# Patient Record
(demographics unavailable — no encounter records)

---

## 2024-10-21 NOTE — PHYSICAL EXAM
[Hui's Sign] : positive Hui's sign [UE] : Sensory: Intact in bilateral upper extremities [ALL] : dorsalis pedis, posterior tibial, femoral, popliteal, and radial 2+ and symmetric bilaterally [] : Motor: [NL] : Motor strength of the left upper extremities is normal [Motor Strength Upper Extremities] : right (5/5) [de-identified] : Range of Motion: is limited and is painful. Cervical TTP right paracervicals.  NTTP C spine  Skin intact C spine. No rashes, ulcers, blisters.  No lymphedema.   [de-identified] : MRI cervical spine [place] [facility] 10/21/19: PRIOR ANTERIOR DISCECTOMY AND FUSION SURGERY, C4-5, C5-6 AND C6-7, UTILIZING DISC GRAFT AND A METALLIC ANTERIOR C4 TO C7 FIXATION PLATE. IMAGING IS LIMITED BY UNAVOIDABLE SUSCEPTIBILITY ARTIFACT, BUT NO SOFT DISC HERNIATION OR EXTRINSIC CORD COMPRESSION IS SEEN. ABNORMAL TUBULAR RIGHT-SIDED INTRACORD T2/STIR HYPERINTENSITY AT THE SURGICAL LEVELS, EXTENDING INTO THE UPPER THORACIC REGION. THIS IS MOST PROMINENT AT C6-7, WITHOUT EXPANSILE CHANGE. 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 SMALL CENTRAL DISC HERNIATION, C3-4, WITH FORAMINAL BONY PRODUCTIVE CHANGE. THERE IS MILD THECAL SAC DEFORMITY AND MILD TO MODERATE BILATERAL FORAMINAL NARROWING. SMALL CENTRAL DISC HERNIATION, C7-T1. THERE IS MILD THECAL SAC COMPRESSION  2 views AP and Lat of Cervical Spine from occipital to C7/T1 10/08/18. Read and dictated by Dr. Chandu Mccann Board Certified Orthopaedic surgeon , ACDF C4-C7  2 views AP and Lat of Cervical Spine from occipital to C7/T1. Read and dictated by Dr. Chandu Mccann Board Certified Orthopaedic surgeon 2/25/2019 - ACDF C4-C7   2 views AP and Lat of Cervical Spine from occipital to C7/T1. Read and dictated by Dr. Chandu Mccann Board Certified Orthopaedic surgeon ACDF C4-C7 10/21/2024.

## 2024-10-21 NOTE — HISTORY OF PRESENT ILLNESS
[None] : No exacerbating factors are noted [Rest] : relieved by rest [de-identified] : 62 yo female with cervical myelomalciae and ACDF C4-C7.  She was in ER.  Her symptoms worsening. She has not had SCS.  She has Biceps torn.   Prior HPI: Pt is s/p ACDF C4-C7 on 06/06/18. Pt states neck pain is stable, constant right neck pain radiates to right side of head, right shoulder and  RUE to elbow, Pt did not f/u with Dr Louise for right shoulder pain. Pt f/u with Michael Devries (Neurologist) and had MRI cervical spine done. Pt stopped  PT, as tis provided no relief in pain. Pt saw Dr Castillo 2 months ago, he recommended PT but pt staes she was unable to participate with PT due to work. Pt unable to recall name of meds. pt stopped f/u with psychiatry . Pt denies Loss of bladder control, bladder incontinence or urinary retention.  [Heat] : not relieved by heat [Ice] : not relieved by ice [Physical Therapy] : not relieved by physical therapy [Recumbency] : not relieved by recumbency [Ataxia] : no ataxia [Incontinence] : no incontinence [Loss of Dexterity] : good dexterity [Urinary Ret.] : no urinary retention

## 2024-10-21 NOTE — DISCUSSION/SUMMARY
[de-identified] : 62 yo female s/p ACDF for spinal cord compression with successful prevention of paralysis, with improved symptoms, residual symptoms musculoskeletal in nature and likely from chronic myelomalciae. She has not seen SCS.  She also has had 2 years of torn biceps.  Recommend f/u Dr. Louise.   Diagnosis, prognosis, natural history and treatment was discussed with patient. Patient was advised if the following symptoms develop: chills, fever, loss of bladder control, bladder incontinence or urinary retention, numbness/tingling or weakness is present in upper or lower extremities, to go to the nearest ER and to call the office immediately to inform us.

## 2024-10-21 NOTE — REASON FOR VISIT
[Cervical Myelopathy/Myopathy] : cervical myelopathy/myopathy [Follow-Up Visit] : a follow-up visit for [Other: ____] : [unfilled]

## 2024-11-06 NOTE — HISTORY OF PRESENT ILLNESS
[de-identified] : 61-year-old female history of cervical spine surgery with right shoulder pain and weakness reports pain and weakness for many years she reports she was seen by an outside doctor and had imaging and other workups done in the past she has none of that information with her today and has no idea or any of that was done.  She reports pain in her shoulder pain at nighttime pain with overhead activities The patient's past medical history, past surgical history, medications, allergies, and social history were reviewed by me today with the patient and documented accordingly. In addition, the patient's family history, which is noncontributory to this visit, was also reviewed.

## 2024-11-06 NOTE — PHYSICAL EXAM
[de-identified] : General Exam  Well developed, well nourished No apparent distress Oriented to person, place, and time Mood: Normal Affect: Normal Balance and coordination: Normal Gait: Normal  Right shoulder exam  Inspection: No swelling, ecchymosis or gross deformity. Skin: No masses, No lesions Tenderness: No bicipital tenderness, no tenderness to the greater tuberosity/RTC insertion, no anterior shoulder/lesser tuberosity tenderness. No tenderness SC joint, clavicle, AC joint. ROM: 160/60/T6 passively.  She can actively forward elevate to 120 degrees with pain Impingement tests: Positive Christy AC Joint: no pain with cross arm testing Biceps: Negative speed Strength: 4/5 abduction, 5/5 external rotation, and internal rotation Neuro: AIN, PIN, Ulnar nerve motor intact Sensation: Intact to light touch in radial, median, ulnar, and axillary nerve distributions Vasc: 2+ radial pulse [de-identified] : The following radiographs were ordered and read by me during this patients visit. I reviewed each radiograph in detail with the patient and discussed the findings as highlighted below.  3 views right shoulder obtained today the glenohumeral joint is well-maintained there is normal alignment there is no fracture

## 2024-12-18 NOTE — DISCUSSION/SUMMARY
[de-identified] : Right shoulder rotator cuff tendinopathy rotator cuff tear and biceps subluxation  Injection: Right shoulder biceps sheath Indication: Biceps tendinitis rotator cuff tear.   A discussion was had with the patient regarding this procedure and all questions were answered. All risks, benefits and alternatives were discussed. These included but were not limited to bleeding, infection, and allergic reaction. Alcohol was used to clean the skin, and betadine was used to sterilize and prep the area in the anterior aspect of the right shoulder. Ethyl chloride spray was then used as a topical anesthetic. A 21-gauge needle was used to inject 4cc of 1% lidocaine and 1cc of 40mg/ml methylprednisolone into the right biceps sheath and subacromial space. A sterile bandage was then applied. The patient tolerated the procedure well and there were no complications.   Ice. Tylenol and anti-inflammatory medications. Follow-up as needed. All questions were answered.  Physical therapy and exercise program if not improved we can discuss shoulder arthroscopy and biceps tenodesis

## 2024-12-18 NOTE — HISTORY OF PRESENT ILLNESS
[de-identified] : 61-year-old female history of cervical spine surgery with right shoulder pain and weakness reports pain and weakness for many years she reports she was seen by an outside doctor and had imaging and other workups done in the past she has none of that information with her today and has no idea or any of that was done.  She reports pain in her shoulder pain at nighttime pain with overhead activities  Her pain is unchanged in the last visit she is here today to review her MRI The patient's past medical history, past surgical history, medications, allergies, and social history were reviewed by me today with the patient and documented accordingly. In addition, the patient's family history, which is noncontributory to this visit, was also reviewed.

## 2024-12-18 NOTE — PHYSICAL EXAM
[de-identified] : General Exam  Well developed, well nourished No apparent distress Oriented to person, place, and time Mood: Normal Affect: Normal Balance and coordination: Normal Gait: Normal  Right shoulder exam  Inspection: No swelling, ecchymosis or gross deformity. Skin: No masses, No lesions Tenderness: No bicipital tenderness, no tenderness to the greater tuberosity/RTC insertion, no anterior shoulder/lesser tuberosity tenderness. No tenderness SC joint, clavicle, AC joint. ROM: 160/60/T6 passively.  She can actively forward elevate to 120 degrees with pain Impingement tests: Positive Christy AC Joint: no pain with cross arm testing Biceps: Negative speed Strength: 4/5 abduction, 5/5 external rotation, and internal rotation Neuro: AIN, PIN, Ulnar nerve motor intact Sensation: Intact to light touch in radial, median, ulnar, and axillary nerve distributions Vasc: 2+ radial pulse [de-identified] : MRI reviewed right shoulder.  There is rotator cuff tendinopathy with articular tearing of the infraspinatus and subscapularis tendons.  There is medial dislocation of the long head of the biceps tendon and mild glenohumeral arthritis

## 2025-03-19 NOTE — HISTORY OF PRESENT ILLNESS
[de-identified] : 62-year-old female history of cervical spine surgery with right shoulder pain and weakness reports pain and weakness for many years she reports she was seen by an outside doctor and had imaging and other workups done in the past she has none of that information with her today and has no idea or any of that was done.  She reports pain in her shoulder pain at nighttime pain with overhead activities She reports no change in her pain since her last visit.  She did corticosteroid injection which did not help her symptoms The patient's past medical history, past surgical history, medications, allergies, and social history were reviewed by me today with the patient and documented accordingly. In addition, the patient's family history, which is noncontributory to this visit, was also reviewed.

## 2025-03-19 NOTE — DISCUSSION/SUMMARY
[de-identified] : Right shoulder rotator cuff tendinopathy rotator cuff tear and biceps subluxation.  It is likely that she has ruptured her biceps since the MRI with her pain and weakness have continued. We discussed treatment options at length we discussed nonoperative care activity modification physical therapy with likely continued weakness we discussed surgical management with right shoulder arthroscopy rotator cuff possible biceps tenodesis versus debridement of the biceps stump. We discussed the surgery postoperative protocol restrictions at length. Risk benefits alternatives discussed including not limited to risks such as bleeding infection nerve or vessel injury continued pain stiffness retear need for future surgery including reverse shoulder arthroplasty medical complications such as DVT or PE and anesthesia complications. All questions were answered. Patient will schedule next appointment at convenience.

## 2025-03-19 NOTE — PHYSICAL EXAM
[de-identified] : General Exam  Well developed, well nourished No apparent distress Oriented to person, place, and time Mood: Normal Affect: Normal Balance and coordination: Normal Gait: Normal  Right shoulder exam  Inspection: No swelling, ecchymosis there is a Jose Guadalupe deformity Skin: No masses, No lesions Tenderness: No bicipital tenderness, no tenderness to the greater tuberosity/RTC insertion, no anterior shoulder/lesser tuberosity tenderness. No tenderness SC joint, clavicle, AC joint. ROM: 160/60/T6 passively.  She can actively forward elevate to 120 degrees with pain Impingement tests: Positive Christy AC Joint: no pain with cross arm testing Biceps: Negative speed Strength: 4/5 abduction, 5/5 external rotation, and internal rotation Neuro: AIN, PIN, Ulnar nerve motor intact Sensation: Intact to light touch in radial, median, ulnar, and axillary nerve distributions Vasc: 2+ radial pulse [de-identified] : MRI reviewed right shoulder.  There is rotator cuff tendinopathy with articular tearing of the infraspinatus and subscapularis tendons.  There is medial dislocation of the long head of the biceps tendon and mild glenohumeral arthritis

## 2025-07-11 NOTE — HISTORY OF PRESENT ILLNESS
[de-identified] : Right Shoulder [de-identified] : 63 yo female s/p Right shoulder arthroscopy, rotator cuff repair, major debridement of biceps stump, superior labrum anterior labrum, subacromial bursa, joint synovitis, 06/26/2025. [de-identified] : right shoulder exam. inc healed well. no erythema no pain gentle passive rom able to flex/ext all fingers silt r/u/m/ax bcr  [de-identified] : Right shoulder arthroscopy, rotator cuff repair, major debridement of biceps stump, superior labrum anterior labrum, subacromial bursa, joint synovitis, 06/26/2025. [de-identified] : Post operative protocol and restrictions discussed. Physical therapy, home exercises and stretches, continue current pain regimen as needed. All questions answered. FU in ______